# Patient Record
(demographics unavailable — no encounter records)

---

## 2024-10-14 NOTE — REASON FOR VISIT
[Symptom and Test Evaluation] : symptom and test evaluation [Hypertension] : hypertension [Follow-Up - Clinic] : a clinic follow-up of [Medication Management] : Medication management

## 2024-10-14 NOTE — DISCUSSION/SUMMARY
[FreeTextEntry1] : ETT and Echo are ordered to evaluate ECG changes. Pt will try to lose weight. She refuses HTN and HLD medication. There are no cardiac contraindications for colonoscopy.  [EKG obtained to assist in diagnosis and management of assessed problem(s)] : EKG obtained to assist in diagnosis and management of assessed problem(s)

## 2024-10-14 NOTE — PHYSICAL EXAM
[General Appearance - Well Developed] : well developed [Normal Appearance] : normal appearance [Well Groomed] : well groomed [No Deformities] : no deformities [General Appearance - Well Nourished] : well nourished [General Appearance - In No Acute Distress] : no acute distress [Eyelids - No Xanthelasma] : the eyelids demonstrated no xanthelasmas [Normal Oral Mucosa] : normal oral mucosa [No Oral Pallor] : no oral pallor [No Oral Cyanosis] : no oral cyanosis [Normal Jugular Venous A Waves Present] : normal jugular venous A waves present [Normal Jugular Venous V Waves Present] : normal jugular venous V waves present [No Jugular Venous Moser A Waves] : no jugular venous moesr A waves [Respiration, Rhythm And Depth] : normal respiratory rhythm and effort [Exaggerated Use Of Accessory Muscles For Inspiration] : no accessory muscle use [Auscultation Breath Sounds / Voice Sounds] : lungs were clear to auscultation bilaterally [Heart Sounds] : normal S1 and S2 [Heart Rate And Rhythm] : heart rate and rhythm were normal [Murmurs] : no murmurs present [Abdomen Tenderness] : non-tender [Abdomen Soft] : soft [Abdomen Mass (___ Cm)] : no abdominal mass palpated [Abnormal Walk] : normal gait [Gait - Sufficient For Exercise Testing] : the gait was sufficient for exercise testing [Nail Clubbing] : no clubbing of the fingernails [Cyanosis, Localized] : no localized cyanosis [Petechial Hemorrhages (___cm)] : no petechial hemorrhages [Skin Color & Pigmentation] : normal skin color and pigmentation [] : no rash [No Venous Stasis] : no venous stasis [Skin Lesions] : no skin lesions [No Skin Ulcers] : no skin ulcer [No Xanthoma] : no  xanthoma was observed [Oriented To Time, Place, And Person] : oriented to person, place, and time [Affect] : the affect was normal [Mood] : the mood was normal [No Anxiety] : not feeling anxious [Well Developed] : well developed [Well Nourished] : well nourished [No Acute Distress] : no acute distress [Normal Conjunctiva] : normal conjunctiva [Normal Venous Pressure] : normal venous pressure [No Carotid Bruit] : no carotid bruit [Normal S1, S2] : normal S1, S2 [No Murmur] : no murmur [No Rub] : no rub [No Gallop] : no gallop [Clear Lung Fields] : clear lung fields [Good Air Entry] : good air entry [No Respiratory Distress] : no respiratory distress  [Soft] : abdomen soft [Non Tender] : non-tender [No Masses/organomegaly] : no masses/organomegaly [Normal Bowel Sounds] : normal bowel sounds [Normal Gait] : normal gait [No Edema] : no edema [No Cyanosis] : no cyanosis [No Clubbing] : no clubbing [No Varicosities] : no varicosities [No Rash] : no rash [No Skin Lesions] : no skin lesions [Moves all extremities] : moves all extremities [No Focal Deficits] : no focal deficits [Normal Speech] : normal speech [Alert and Oriented] : alert and oriented [Normal memory] : normal memory

## 2024-10-14 NOTE — HISTORY OF PRESENT ILLNESS
[FreeTextEntry1] : 56 yo female presents for evaluation T. Pt had a history of cardiac workup in the past prior to lap band surgery. Lap band was removed 2017 at the same time a gastric sleeve was performed. Pt has started Ozempic for weight loss. Pt is anticipating colonoscopy and seeks preoperative evaluation. She is inconsistent with exercise.

## 2025-04-27 NOTE — HISTORY OF PRESENT ILLNESS
[FreeTextEntry1] : 56 yo female PMH: HTN, HLD, Obesity, DM, Sleep apnea Pt had a history of cardiac workup in the past prior to lap band surgery. Lap band was removed 2017 at the same time a gastric sleeve was performed, Cardiac testing reviewed-- she is here today for pre op cardiac evaluation prior to left hand carpel tunnel surgery (followed with Neurology) not yet scheduled, states she had a routine carotid US with her Neurologist and reported plaque right carotid artery and is anticipating MRA study  FH CABG Mother at 64 years of age, Father  MI, brother CABG at 48 additionally s/p R CEA declines EKG today  Currently feeling well no cardiac complaints

## 2025-04-27 NOTE — PHYSICAL EXAM
[Normal S1, S2] : normal S1, S2 [Soft] : abdomen soft [Non Tender] : non-tender [Normal Gait] : normal gait [No Edema] : no edema [Normal] : moves all extremities, no focal deficits, normal speech [Alert and Oriented] : alert and oriented [de-identified] : overweight

## 2025-04-27 NOTE — PHYSICAL EXAM
[Normal S1, S2] : normal S1, S2 [Soft] : abdomen soft [Non Tender] : non-tender [Normal Gait] : normal gait [No Edema] : no edema [Normal] : moves all extremities, no focal deficits, normal speech [Alert and Oriented] : alert and oriented [de-identified] : overweight

## 2025-04-27 NOTE — CARDIOLOGY SUMMARY
[de-identified] : 11/27/24: Baseline electrocardiogram: Normal sinus rhythm at a rate of 66 bpm with no arrhythmias. Stress electrocardiogram: No ischemic ST segment changes. Arrhythmias: Rare PVC and two couplets.   Heart rate and blood pressure: The heart rate response was normal. The blood pressure response was normal.  Exercise Capacity: The patient achieved 13.40 METS, which is consistent with excellent exercise capacity.  [de-identified] : 11/27/24: 1. Left ventricular cavity is normal in size. Left ventricular wall thickness is normal. Left ventricular systolic function is normal with an ejection fraction of 63 % by Mcguire's method of disks. 2. Normal right ventricular systolic function. 3. Left atrium is moderately dilated. 4. The right atrium is normal in size. 5. Mild mitral regurgitation. 6. Mild tricuspid regurgitation. 7. Mild aortic regurgitation. 8. Mild to moderate pulmonic regurgitation.

## 2025-04-27 NOTE — CARDIOLOGY SUMMARY
[de-identified] : 11/27/24: Baseline electrocardiogram: Normal sinus rhythm at a rate of 66 bpm with no arrhythmias. Stress electrocardiogram: No ischemic ST segment changes. Arrhythmias: Rare PVC and two couplets.   Heart rate and blood pressure: The heart rate response was normal. The blood pressure response was normal.  Exercise Capacity: The patient achieved 13.40 METS, which is consistent with excellent exercise capacity.  [de-identified] : 11/27/24: 1. Left ventricular cavity is normal in size. Left ventricular wall thickness is normal. Left ventricular systolic function is normal with an ejection fraction of 63 % by Mcguire's method of disks. 2. Normal right ventricular systolic function. 3. Left atrium is moderately dilated. 4. The right atrium is normal in size. 5. Mild mitral regurgitation. 6. Mild tricuspid regurgitation. 7. Mild aortic regurgitation. 8. Mild to moderate pulmonic regurgitation.

## 2025-04-27 NOTE — DISCUSSION/SUMMARY
[EKG obtained to assist in diagnosis and management of assessed problem(s)] : EKG obtained to assist in diagnosis and management of assessed problem(s) [FreeTextEntry1] : here today for pre op cardiac evaluation prior to left hand carpel tunnel surgery--during this work up VIA her neurologist a carotid us done and reported plaque right carotid artery and is anticipating MRA study has begun baby ASA will await results consider statin--surgery not yet scheduled   She is concerned about her cardiac risks and asked to have a discussion regarding further testing for risk stratification/CTA, she has multiple cardia risk factors: FH of early CAD/CABG, Mother CABG at 64 years of age, Father  MI, brother CABG at 48 also s/p R CEA and personal h/o Obesity, HTN, HLD untreated (reports sub optimal lipids with recent labs) and obesity--have discussed CTA heart/coronary arteries/calcium score for further risk stratification  Will obtain copy of labs and carotid US--She has in the past refused medication for HTN/HLD Denies contrast/shellfish allergy, denies pregnancy and declined pregnancy test  follow up after testing